# Patient Record
Sex: FEMALE | Race: WHITE | NOT HISPANIC OR LATINO | Employment: UNEMPLOYED | URBAN - METROPOLITAN AREA
[De-identification: names, ages, dates, MRNs, and addresses within clinical notes are randomized per-mention and may not be internally consistent; named-entity substitution may affect disease eponyms.]

---

## 2017-08-30 ENCOUNTER — APPOINTMENT (OUTPATIENT)
Dept: GENERAL RADIOLOGY | Facility: CLINIC | Age: 42
End: 2017-08-30
Attending: EMERGENCY MEDICINE
Payer: COMMERCIAL

## 2017-08-30 ENCOUNTER — HOSPITAL ENCOUNTER (EMERGENCY)
Facility: CLINIC | Age: 42
Discharge: HOME | End: 2017-08-30
Attending: EMERGENCY MEDICINE | Admitting: EMERGENCY MEDICINE
Payer: COMMERCIAL

## 2017-08-30 VITALS
OXYGEN SATURATION: 99 % | TEMPERATURE: 98.7 F | WEIGHT: 160 LBS | HEART RATE: 80 BPM | DIASTOLIC BLOOD PRESSURE: 67 MMHG | SYSTOLIC BLOOD PRESSURE: 93 MMHG | RESPIRATION RATE: 16 BRPM

## 2017-08-30 DIAGNOSIS — S56.129A FLEXOR TENDON LACERATION OF FINGER WITH OPEN WOUND, INITIAL ENCOUNTER: ICD-10-CM

## 2017-08-30 DIAGNOSIS — S61.209A FLEXOR TENDON LACERATION OF FINGER WITH OPEN WOUND, INITIAL ENCOUNTER: ICD-10-CM

## 2017-08-30 PROCEDURE — 99283 EMERGENCY DEPT VISIT LOW MDM: CPT

## 2017-08-30 PROCEDURE — 12001 RPR S/N/AX/GEN/TRNK 2.5CM/<: CPT

## 2017-08-30 PROCEDURE — 73130 X-RAY EXAM OF HAND: CPT | Mod: LT

## 2017-08-30 RX ORDER — LIDOCAINE HYDROCHLORIDE 10 MG/ML
INJECTION, SOLUTION EPIDURAL; INFILTRATION; INTRACAUDAL; PERINEURAL
Status: DISCONTINUED
Start: 2017-08-30 | End: 2017-08-30 | Stop reason: HOSPADM

## 2017-08-30 ASSESSMENT — ENCOUNTER SYMPTOMS
NUMBNESS: 1
WOUND: 1
LIGHT-HEADEDNESS: 1
DIZZINESS: 1

## 2017-08-30 NOTE — ED NOTES
Pt with laceration to left palm of hand after cutting herself with steak knife on accident, happened around 1pm. Unable to bend pinky and c/o numbness. ABC's intact, alert and oriented X3.

## 2017-08-30 NOTE — ED AVS SNAPSHOT
[facility] Emergency Department    [address]    [facility] [address]    Phone:  [phone]    Fax:  [phone]                                       Valencia Cruz   MRN: [id number]    Department:  [facility] Emergency Department   Date of Visit:  8/30/2017           Patient Information     Date Of Birth          1975        Your diagnoses for this visit were:     Flexor tendon laceration of finger with open wound, initial encounter        You were seen by Kory Webb MD.      Follow-up Information     Follow up with Kang Allen MD. Schedule an appointment as soon as possible for a visit in 1 day.    Specialty:  Orthopedics    Contact information:    [facility]  [address]   [facility] [address]  [phone]          Discharge Instructions         Tendon Laceration  A tendon is a thick cord that joins muscle to bone and causes the joints to bend and straighten. One of your tendons has been cut. A tendon cut may be partial or complete. A complete cut of the tendon and a severe partial cut will need stitches (sutures) in the tendon. Smaller cuts in the tendon do not require stitches, but the cut in the skin will need to be closed with stitches or staples.  A cut tendon takes about 6 weeks to regain its full strength. Forceful use of the tendon too soon could cause the weakened tendon to tear apart.  Antibiotics may be prescribed to reduce the risk of infection in the tendon.  Some tendons are located close to the nerves, therefore, it is possible to bruise or cut a nerve when you injure a tendon. This may cause numbness or weakness of the hand or foot. Because of local pain and swelling at the time of injury it can be difficult to fully assess nerve function. If you notice numbness or weakness that persists, notify your doctor. A nerve repair can be done 5-10 days after injury.  Home care    Keep the injured part elevated during  the first 48 hours to reduce swelling and pain.    If a splint was applied, leave it in place until your next exam (unless told otherwise). Keep the part dry when bathing by covering it in a plastic bag sealed with a rubber band at the top end.    If no splint was applied, change the bandage after 24 hours and begin cleaning the wound once a day with soap and water.    After removing the bandage, wash the area with soap and water. Use a wet cotton swab (Q tip) to loosen and remove any blood or crust that forms. After cleaning, apply a thin layer of over-the-counter antibiotic ointment. This will keep the wound clean and make it easier to remove the stitches or staples. Reapply a fresh bandage.    Remove the bandage to shower as usual after the first 24 hours, but do not soak the area in water (no swimming) until the stitches or staples are removed.    If antibiotics were prescribed, take them as directed until they are gone or you are told to stop.    Take medicines for pain as directed by the healthcare provider.    If you are not current on your vaccination and the object that caused the cut may lead to tetanus, you may be given a tetanus shot.  Follow-up care  Stitches placed in the tendon will not need to be removed. Stitches or staples placed in the skin will be removed in 7-10 days. Be sure to keep your appointment for removal. At your follow up visit, talk to your doctor about when to begin exercising the tendon in order to prevent stiffness.    When to seek medical advice  Call your healthcare provider right away if any of these occur:    Wound bleeding not controlled by direct pressure    Signs of infection, including increasing pain in the wound, increasing wound redness or swelling, or pus or bad odor coming from the wound    Fever of 100.4 F (38 C) or higher or as directed by your healthcare provider    Stitches or staples come apart or fall out    Wound edges re-open    Wound changes colors    Numbness  around the wound     Decreased movement around the injured area    Persistent numbness or weakness in the injured extremity  Date Last Reviewed: 6/14/2015 2000-2017 [employer]. [address] [place] [address], [place] [address]. All rights reserved. This information is not intended as a substitute for professional medical care. Always follow your healthcare professional's instructions.          24 Hour Appointment Hotline       To make an appointment at any [facility], call [phone] ([phone]). If you don't have a family doctor or clinic, we will help you find one. [facility] are conveniently located to serve the needs of you and your family.             Review of your medicines      Our records show that you are taking the medicines listed below. If these are incorrect, please call your family doctor or clinic.        Dose / Directions Last dose taken    PHENTERMINE HCL PO        Refills:  0                Procedures and tests performed during your visit     Hand XR, G/E 3 views, left      Orders Needing Specimen Collection     None      Pending Results     No orders found from 8/28/2017 to 8/31/2017.            Pending Culture Results     No orders found from 8/28/2017 to 8/31/2017.            Pending Results Instructions     If you had any lab results that were not finalized at the time of your Discharge, you can call the ED Lab Result RN at [phone]. You will be contacted by this team for any positive Lab results or changes in treatment. The nurses are available 7 days a week from 10A to 6:30P.  You can leave a message 24 hours per day and they will return your call.        Test Results From Your Hospital Stay        8/30/2017  5:07 PM      Narrative     LEFT HAND THREE OR MORE VIEWS  8/30/2017 4:53 PM     HISTORY: Laceration.    COMPARISON: None.        Impression     IMPRESSION: Bones are normally aligned. No acute fracture. Rounded  radiopaque densities overlie the distal  fourth phalanx, thought to be  external surface of the nailbed. No radiopaque foreign body. No acute  fracture. Lucency noted in the third middle phalanx, likely small bone  cyst. No aggressive features identified.    GEOVANNI WILBURN MD                Clinical Quality Measure: Blood Pressure Screening     Your blood pressure was checked while you were in the emergency department today. The last reading we obtained was  BP: 93/67 . Please read the guidelines below about what these numbers mean and what you should do about them.  If your systolic blood pressure (the top number) is less than 120 and your diastolic blood pressure (the bottom number) is less than 80, then your blood pressure is normal. There is nothing more that you need to do about it.  If your systolic blood pressure (the top number) is 120-139 or your diastolic blood pressure (the bottom number) is 80-89, your blood pressure may be higher than it should be. You should have your blood pressure rechecked within a year by a primary care provider.  If your systolic blood pressure (the top number) is 140 or greater or your diastolic blood pressure (the bottom number) is 90 or greater, you may have high blood pressure. High blood pressure is treatable, but if left untreated over time it can put you at risk for heart attack, stroke, or kidney failure. You should have your blood pressure rechecked by a primary care provider within the next 4 weeks.  If your provider in the emergency department today gave you specific instructions to follow-up with your doctor or provider even sooner than that, you should follow that instruction and not wait for up to 4 weeks for your follow-up visit.        Thank you for choosing [facility]       Thank you for choosing [facility] for your care. Our goal is always to provide you with excellent care. Hearing back from our patients is one way we can continue to improve our services. Please take a few minutes to complete the written  "survey that you may receive in the mail after you visit with us. Thank you!        [email or web address] Information     [employer] lets you send messages to your doctor, view your test results, renew your prescriptions, schedule appointments and more. To sign up, go to [email or web address] . Click on \"Log in\" on the left side of the screen, which will take you to the Welcome page. Then click on \"Sign up Now\" on the right side of the page.     You will be asked to enter the access code listed below, as well as some personal information. Please follow the directions to create your username and password.     Your access code is: [email or web address]  Expires: 2017  6:21 PM     Your access code will  in 90 days. If you need help or a new code, please call your [facility] clinic or [phone].        Care EveryWhere ID     This is your Care EveryWhere ID. This could be used by other organizations to access your [facility] medical records  [id number]        Equal Access to Services     ELÍAS LR : Hadii seema pango S[place]lie, waaxda luqadaha, qaybta kaalmaleanne wallace, makenna fiore . [facility] [phone].    ATENCIÓN: Si habla español, tiene a peace disposición servicios gratuitos de asistencia lingüística. Llame al [phone].    We comply with applicable federal civil rights laws and [place] laws. We do not discriminate on the basis of race, color, national origin, age, disability sex, sexual orientation or gender identity.            After Visit Summary       This is your record. Keep this with you and show to your community pharmacist(s) and doctor(s) at your next visit.                  "

## 2017-08-30 NOTE — ED AVS SNAPSHOT
[facility] Emergency Department    [address]    [facility] [address]    Phone:  [phone]    Fax:  [phone]                                       Valencia Cruz   MRN: [id number]    Department:  [facility] Emergency Department   Date of Visit:  8/30/2017           After Visit Summary Signature Page     I have received my discharge instructions, and my questions have been answered. I have discussed any challenges I see with this plan with the nurse or doctor.    ..........................................................................................................................................  Patient/Patient Representative Signature      ..........................................................................................................................................  Patient Representative Print Name and Relationship to Patient    ..................................................               ................................................  Date                                            Time    ..........................................................................................................................................  Reviewed by Signature/Title    ...................................................              ..............................................  Date                                                            Time

## 2017-08-30 NOTE — ED PROVIDER NOTES
History     Chief Complaint:  Laceration    HPI   Valencia Cruz is a 42 year old female who presents to the emergency department today for evaluation of a left hand laceration. The laceration is to her left palm after cutting herself with a steak knife on accident around 1 pm this afternoon. The patient got hot, dizzy and lightheaded directly after the cut. She is unable to bend her pinky and has numbness here in the emergency department.    Allergies:  Vicodin [Hydrocodone-Acetaminophen]    Medications:    Phentermine    Past Medical History:    Medical history reviewed. No pertinent medical history.    Past Surgical History:    Surgical history reviewed. No pertinent surgical history.    Family History:    Family history reviewed. No pertinent family history.     Social History:  Smoking Status: Never Smoker  Smokeless Tobacco: Never Used  Alcohol Use: Positive    Review of Systems   Skin: Positive for wound.   Neurological: Positive for dizziness, light-headedness and numbness.   All other systems reviewed and are negative.      Physical Exam   Vitals:  Patient Vitals for the past 24 hrs:   BP Temp Temp src Pulse Heart Rate Resp SpO2 Weight   08/30/17 1745 - - - - - - 99 % -   08/30/17 1730 - - - - - - 99 % -   08/30/17 1700 - - - - - - 96 % -   08/30/17 1652 93/67 - - - - - - -   08/30/17 1604 (!) 181/142 98.7  F (37.1  C) Oral 80 80 16 97 % 72.6 kg (160 lb)     Physical Exam  Constitutional: Patient is well appearing. No distress.  Head: Atraumatic.  Mouth/Throat: Oropharynx is clear and moist. No oropharyngeal exudate.  Eyes: Conjunctivae and EOM are normal. No scleral icterus.  Neck: Normal range of motion. Neck supple.   Cardiovascular: Normal rate, regular rhythm, normal heart sounds and intact distal pulses.   Pulmonary/Chest: Breath sounds normal. No respiratory distress.  Abdominal: Soft. Bowel sounds are normal. No distension. No tenderness. No rebound or guarding.   Musculoskeletal: 1 cm incisional  laceration on palafox surface of interweb between 4th and 5th fingers. Extension of right 5th finger intact. Flection proximal and distal absent. Paresthesia of 5th finger. Neurovascularly intact at tip. No edema or tenderness.   Neurological: Alert and orientated to person, place, and time. No observable focal neuro deficit  Skin: Warm and dry. No rash noted. Not diaphoretic.       Emergency Department Course     Imaging:  Hand XR, G/E 3 Views, Left  Bones are normally aligned. No acute fracture. Rounded radiopaque densities overlie the distal fourth phalanx, thought to be external surface of the nailbed. No radiopaque foreign body. No acute fracture. Lucency noted in the third middle phalanx, likely small bone cyst. No aggressive features identified.  As per radiology.    Procedure:      Laceration Repair      LACERATION:  A simple clean 1 cm laceration.    LOCATION:  Palafox side of left hand.    FUNCTION:  Distally sensation and circulation are intact.    ANESTHESIA:  Local using lidocaine total of 3 mLs     PREPARATION:  Irrigation and Scrubbing with Normal Saline and Shur Clens.    DEBRIDEMENT:  no debridement.    CLOSURE:  Wound was closed with One Layer.  Skin closed with 1 x 3.0 Ethylon using interrupted sutures..      Emergency Department Course:  Nursing notes and vitals reviewed.  I performed an exam of the patient as documented above.     The patient was sent for a Hand Xray while in the emergency department, findings above.     17:35 I cleaned and numbed the patient's laceration.  17:50 I performed the laceration repair as documented above.  18:10 I reevaluated the patient and provided an update in regards to her ED course.    18:18 I consulted with Dr. Allen, hand surgeon, about setting up surgery for the patient.    Findings and plan explained to the Patient. Patient discharged home with instructions regarding supportive care, medications, and reasons to return. The importance of close follow-up was  reviewed.       Impression & Plan      Medical Decision Making:  Valencia Cruz is a 42 year old female palmar small laceration that likely injured flexor tendons.  Discussed with on call hand simple closure splinted and has F/u in the am.      Diagnosis:    ICD-10-CM    1. Flexor tendon laceration of finger with open wound, initial encounter S56.129A     S61.209A         Disposition:   Discharged to home    Discharge Medications:  Discharge Medication List as of 8/30/2017  6:27 PM        Scribe Disclosure:  CLARICE, Gibran Bee, am serving as a scribe on 8/30/2017 at 5:17 PM to personally document services performed by Kory Webb MD based on my observations and the provider's statements to me.     [facility] EMERGENCY DEPARTMENT       Kory Webb MD  08/30/17 5016

## 2017-08-30 NOTE — DISCHARGE INSTRUCTIONS
Tendon Laceration  A tendon is a thick cord that joins muscle to bone and causes the joints to bend and straighten. One of your tendons has been cut. A tendon cut may be partial or complete. A complete cut of the tendon and a severe partial cut will need stitches (sutures) in the tendon. Smaller cuts in the tendon do not require stitches, but the cut in the skin will need to be closed with stitches or staples.  A cut tendon takes about 6 weeks to regain its full strength. Forceful use of the tendon too soon could cause the weakened tendon to tear apart.  Antibiotics may be prescribed to reduce the risk of infection in the tendon.  Some tendons are located close to the nerves, therefore, it is possible to bruise or cut a nerve when you injure a tendon. This may cause numbness or weakness of the hand or foot. Because of local pain and swelling at the time of injury it can be difficult to fully assess nerve function. If you notice numbness or weakness that persists, notify your doctor. A nerve repair can be done 5-10 days after injury.  Home care    Keep the injured part elevated during the first 48 hours to reduce swelling and pain.    If a splint was applied, leave it in place until your next exam (unless told otherwise). Keep the part dry when bathing by covering it in a plastic bag sealed with a rubber band at the top end.    If no splint was applied, change the bandage after 24 hours and begin cleaning the wound once a day with soap and water.    After removing the bandage, wash the area with soap and water. Use a wet cotton swab (Q tip) to loosen and remove any blood or crust that forms. After cleaning, apply a thin layer of over-the-counter antibiotic ointment. This will keep the wound clean and make it easier to remove the stitches or staples. Reapply a fresh bandage.    Remove the bandage to shower as usual after the first 24 hours, but do not soak the area in water (no swimming) until the stitches or  staples are removed.    If antibiotics were prescribed, take them as directed until they are gone or you are told to stop.    Take medicines for pain as directed by the healthcare provider.    If you are not current on your vaccination and the object that caused the cut may lead to tetanus, you may be given a tetanus shot.  Follow-up care  Stitches placed in the tendon will not need to be removed. Stitches or staples placed in the skin will be removed in 7-10 days. Be sure to keep your appointment for removal. At your follow up visit, talk to your doctor about when to begin exercising the tendon in order to prevent stiffness.    When to seek medical advice  Call your healthcare provider right away if any of these occur:    Wound bleeding not controlled by direct pressure    Signs of infection, including increasing pain in the wound, increasing wound redness or swelling, or pus or bad odor coming from the wound    Fever of 100.4 F (38 C) or higher or as directed by your healthcare provider    Stitches or staples come apart or fall out    Wound edges re-open    Wound changes colors    Numbness around the wound     Decreased movement around the injured area    Persistent numbness or weakness in the injured extremity  Date Last Reviewed: 6/14/2015 2000-2017 [employer]. [address] [place] [address], [place] [address]. All rights reserved. This information is not intended as a substitute for professional medical care. Always follow your healthcare professional's instructions.

## 2020-04-15 ENCOUNTER — HOSPITAL ENCOUNTER (EMERGENCY)
Facility: CLINIC | Age: 45
Discharge: HOME | End: 2020-04-16
Attending: EMERGENCY MEDICINE | Admitting: EMERGENCY MEDICINE
Payer: MEDICAID

## 2020-04-15 DIAGNOSIS — R07.89 CHEST WALL PAIN: ICD-10-CM

## 2020-04-15 LAB
ANION GAP SERPL CALCULATED.3IONS-SCNC: 5 MMOL/L (ref 3–14)
BASOPHILS # BLD AUTO: 0.1 10E9/L (ref 0–0.2)
BASOPHILS NFR BLD AUTO: 0.8 %
BUN SERPL-MCNC: 11 MG/DL (ref 7–30)
CALCIUM SERPL-MCNC: 8.5 MG/DL (ref 8.5–10.1)
CHLORIDE SERPL-SCNC: 106 MMOL/L (ref 94–109)
CO2 SERPL-SCNC: 28 MMOL/L (ref 20–32)
CREAT SERPL-MCNC: 0.65 MG/DL (ref 0.52–1.04)
D DIMER PPP FEU-MCNC: 0.9 UG/ML FEU (ref 0–0.5)
DIFFERENTIAL METHOD BLD: NORMAL
EOSINOPHIL # BLD AUTO: 0.3 10E9/L (ref 0–0.7)
EOSINOPHIL NFR BLD AUTO: 3 %
ERYTHROCYTE [DISTWIDTH] IN BLOOD BY AUTOMATED COUNT: 12.8 % (ref 10–15)
GFR SERPL CREATININE-BSD FRML MDRD: >90 ML/MIN/{1.73_M2}
GLUCOSE SERPL-MCNC: 90 MG/DL (ref 70–99)
HCT VFR BLD AUTO: 38 % (ref 35–47)
HGB BLD-MCNC: 12 G/DL (ref 11.7–15.7)
IMM GRANULOCYTES # BLD: 0 10E9/L (ref 0–0.4)
IMM GRANULOCYTES NFR BLD: 0.3 %
LYMPHOCYTES # BLD AUTO: 2.8 10E9/L (ref 0.8–5.3)
LYMPHOCYTES NFR BLD AUTO: 32.5 %
MCH RBC QN AUTO: 28.8 PG (ref 26.5–33)
MCHC RBC AUTO-ENTMCNC: 31.6 G/DL (ref 31.5–36.5)
MCV RBC AUTO: 91 FL (ref 78–100)
MONOCYTES # BLD AUTO: 0.7 10E9/L (ref 0–1.3)
MONOCYTES NFR BLD AUTO: 8.1 %
NEUTROPHILS # BLD AUTO: 4.8 10E9/L (ref 1.6–8.3)
NEUTROPHILS NFR BLD AUTO: 55.3 %
NRBC # BLD AUTO: 0 10*3/UL
NRBC BLD AUTO-RTO: 0 /100
PLATELET # BLD AUTO: 334 10E9/L (ref 150–450)
POTASSIUM SERPL-SCNC: 3.3 MMOL/L (ref 3.4–5.3)
RBC # BLD AUTO: 4.17 10E12/L (ref 3.8–5.2)
SODIUM SERPL-SCNC: 139 MMOL/L (ref 133–144)
TROPONIN I SERPL-MCNC: <0.015 UG/L (ref 0–0.04)
WBC # BLD AUTO: 8.7 10E9/L (ref 4–11)

## 2020-04-15 PROCEDURE — 84484 ASSAY OF TROPONIN QUANT: CPT | Performed by: EMERGENCY MEDICINE

## 2020-04-15 PROCEDURE — 80048 BASIC METABOLIC PNL TOTAL CA: CPT | Performed by: EMERGENCY MEDICINE

## 2020-04-15 PROCEDURE — 85379 FIBRIN DEGRADATION QUANT: CPT | Performed by: EMERGENCY MEDICINE

## 2020-04-15 PROCEDURE — 85025 COMPLETE CBC W/AUTO DIFF WBC: CPT | Performed by: EMERGENCY MEDICINE

## 2020-04-15 PROCEDURE — 93005 ELECTROCARDIOGRAM TRACING: CPT

## 2020-04-15 PROCEDURE — 99285 EMERGENCY DEPT VISIT HI MDM: CPT | Mod: 25

## 2020-04-15 PROCEDURE — 96360 HYDRATION IV INFUSION INIT: CPT | Mod: 59

## 2020-04-15 ASSESSMENT — ENCOUNTER SYMPTOMS
DIARRHEA: 0
FEVER: 0
COUGH: 1
ABDOMINAL PAIN: 0
VOMITING: 0
NAUSEA: 0

## 2020-04-15 ASSESSMENT — MIFFLIN-ST. JEOR: SCORE: 1470.13

## 2020-04-15 NOTE — ED AVS SNAPSHOT
[facility] Emergency Department  [address]  [facility] [address]  Phone:  [phone]  Fax:  [phone]                                    Valencia Cruz   MRN: [id number]    Department:  [facility] Emergency Department   Date of Visit:  4/15/2020           After Visit Summary Signature Page    I have received my discharge instructions, and my questions have been answered. I have discussed any challenges I see with this plan with the nurse or doctor.    ..........................................................................................................................................  Patient/Patient Representative Signature      ..........................................................................................................................................  Patient Representative Print Name and Relationship to Patient    ..................................................               ................................................  Date                                   Time    ..........................................................................................................................................  Reviewed by Signature/Title    ...................................................              ..............................................  Date                                               Time          22EPIC Rev 08/18

## 2020-04-16 ENCOUNTER — APPOINTMENT (OUTPATIENT)
Dept: CT IMAGING | Facility: CLINIC | Age: 45
End: 2020-04-16
Attending: EMERGENCY MEDICINE
Payer: MEDICAID

## 2020-04-16 VITALS
RESPIRATION RATE: 15 BRPM | BODY MASS INDEX: 33.44 KG/M2 | DIASTOLIC BLOOD PRESSURE: 86 MMHG | TEMPERATURE: 98.9 F | WEIGHT: 188.71 LBS | HEART RATE: 82 BPM | OXYGEN SATURATION: 100 % | HEIGHT: 63 IN | SYSTOLIC BLOOD PRESSURE: 121 MMHG

## 2020-04-16 LAB — INTERPRETATION ECG - MUSE: NORMAL

## 2020-04-16 PROCEDURE — 25800030 ZZH RX IP 258 OP 636: Performed by: EMERGENCY MEDICINE

## 2020-04-16 PROCEDURE — 71275 CT ANGIOGRAPHY CHEST: CPT

## 2020-04-16 PROCEDURE — 25000128 H RX IP 250 OP 636: Performed by: EMERGENCY MEDICINE

## 2020-04-16 PROCEDURE — 96360 HYDRATION IV INFUSION INIT: CPT | Mod: 59

## 2020-04-16 RX ORDER — IOPAMIDOL 755 MG/ML
500 INJECTION, SOLUTION INTRAVASCULAR ONCE
Status: COMPLETED | OUTPATIENT
Start: 2020-04-16 | End: 2020-04-16

## 2020-04-16 RX ADMIN — SODIUM CHLORIDE 84 ML: 9 INJECTION, SOLUTION INTRAVENOUS at 00:16

## 2020-04-16 RX ADMIN — IOPAMIDOL 64 ML: 755 INJECTION, SOLUTION INTRAVENOUS at 00:16

## 2020-04-16 NOTE — ED TRIAGE NOTES
Pt c/o L sided CP that radiates into her L shoulder since yesterday, pain worse with movement. No SOB, fever, cough, diarrhea. ABC intact.

## 2020-04-16 NOTE — ED NOTES
I explained to pt the results of her d-dimer lab test and why she will be receiving a CT scan of her chest. Additionally, I told her about how long the exam lasts and how long results typically take. She verbalized understanding, questions she had were answered.    Telephone Encounter by Fredo Cho RN at 09/05/17 04:41 PM     Author:  Fredo Cho RN Service:  (none) Author Type:  Registered Nurse     Filed:  09/05/17 04:42 PM Encounter Date:  9/4/2017 Status:  Signed     :  Fredo Cho RN (Registered Nurse)            Below refill request requires your intervention because:[DC1.1T]   Â· No protocol for medication available[DC1.1M]           Revision History        User Key Date/Time User Provider Type Action    > DC1.1 09/05/17 04:42 PM Fredo Cho RN Registered Nurse Sign    M - Manual, T - Template

## 2020-04-16 NOTE — ED PROVIDER NOTES
History     Chief Complaint:  Chest pain      HPI   Valencia Cruz is a 45 year old female who presents with chest pain. The patient developed pain to left upper back by her left axilla last night. She states that the pain stayed constant but then gradually got worse. The pain moved to her left axilla where it stayed until this morning when the pain started migrating to her left chest. She states that her pain in her left axilla last night did not get worse with palpation, but the left sided chest pain today is worse with palpation. She also notes that it hurts to take a deep breath. In addition, she states that she has a dry cough, but denies having a fever. She denies any abdominal pain, diarrhea, nausea, or vomiting.      Allergies:  Vicodin [Hydrocodone-Acetaminophen]     Medications:    Phentermine     Past Medical History:    Migraine     Past Surgical History:    D&C  Reduction mammaplasty   Nerve and tendon reattachment     Family History:    ADHD  Anxiety   Bipolar   Depression  Migraines   Osteoporosis   Thyroid disorder     Social History:  Smoking Status: former  Smokeless Tobacco: Never Used  Alcohol Use: Yes  PCP: Herlinda Berg     Review of Systems   Constitutional: Negative for fever.   Respiratory: Positive for cough.    Cardiovascular: Positive for chest pain (left).   Gastrointestinal: Negative for abdominal pain, diarrhea, nausea and vomiting.   All other systems reviewed and are negative.      Physical Exam     Patient Vitals for the past 24 hrs:   BP Temp Temp src Heart Rate Resp SpO2 Height Weight   04/16/20 0040 121/86 -- -- 81 15 100 % -- --   04/16/20 0035 -- -- -- 85 -- 100 % -- --   04/16/20 0030 -- -- -- 77 14 100 % -- --   04/16/20 0025 133/101 -- -- 75 -- 100 % -- --   04/16/20 0000 136/88 -- -- 83 16 99 % -- --   04/15/20 2345 125/84 -- -- 69 10 100 % -- --   04/15/20 2330 140/88 -- -- 83 16 99 % -- --   04/15/20 2320 138/91 -- -- 86 14 -- -- --   04/15/20 2306 136/95 98.9  F (37.2  " C) Oral 90 16 100 % 1.6 m (5' 3\") 85.6 kg (188 lb 11.4 oz)        Physical Exam  Nursing note and vitals reviewed.  Constitutional: Cooperative.   HENT:   Mouth/Throat: Mucous membranes are normal.   Cardiovascular: Normal rate, regular rhythm and normal heart sounds.  No murmur.  Pulmonary/Chest: Effort normal and breath sounds normal. No respiratory distress. No wheezes. No rales.   Abdominal: Soft. Normal appearance and bowel sounds are normal. No distension. There is no tenderness.   Musculoskeletal: No LE edema  Neurological: Alert. Oriented x4  Skin: Skin is warm and dry. No rash noted on back or left chest. .   Psychiatric: Normal mood and affect.      Emergency Department Course   ECG:  ECG taken at 2318  Normal sinus rhythm with sinus arrhythmia   Rate 75 bpm. ID interval 130 ms. QRS duration 88 ms. QT/QTc 364/406 ms. P-R-T axes 63 77 55.     Imaging:  Radiology findings were communicated with the patient who voiced understanding of the findings.    CT Chest Pulmonary Embolism w Contrast  Final Result  IMPRESSION:  1.  No visualized pulmonary embolism or other acute findings in the chest.  2.  Cholelithiasis.  Reading per radiology     Laboratory:  Laboratory findings were communicated with the patient who voiced understanding of the findings.    CBC:  WBC 8.7, HGB 12.0, , o/w WNL  BMP: potassium 3.3 (L), o/w WNL (Creatinine: 0.65)     Troponin(2318):  <0.015    D dimer quantitative: 0.9 (H)    Emergency Department Course:  Past medical records, nursing notes, and vitals reviewed.    2309 I performed an exam of the patient as documented above.    IV was inserted and blood was drawn for laboratory testing, results above.     The patient was sent for imaging while in the emergency department, results    above.       0040 Patient rechecked and updated.       Findings and plan explained to the Patient. Patient discharged home with instructions regarding supportive care, medications, and reasons to " return. The importance of close follow-up was reviewed.        Impression & Plan     Medical Decision Making:  Valencia Cruz is a 45 year old female who presents to the emergency department today with over 24 hours of positional chest pain that started in her back armpit region and is worse with movement. No fevers though she did have a dry cough recently. She is not hypoxic. Workup here are significant for an elevated dimer which prompted CT imaging. Other than cholelithiasis, this was negative. Troponin is also negative and after 24 hours of symptoms this safely rules out other cardiac causes. No pericardial effusion or concern for tia or pericarditis. No pneumonia or pneumothorax. Her abdominal exam was completely benign and the gall stones are likely an incidental finding. I suspect chest wall inflammation given her recent illness and discussed this in detail with her with appropriate follow up instructions.      Discharge Diagnosis:    ICD-10-CM    1. Chest wall pain  R07.89        Disposition:  The patient is discharged to home.    Scribe Disclosure:  IJerry, am serving as a scribe at 11:09 PM on 4/15/2020 to document services personally performed by Randy Pitts MD based on my observations and the provider's statements to me.      4/15/2020   Randy Pitts MD Amdahl, John, MD  04/16/20 0147

## 2023-01-04 ENCOUNTER — HOSPITAL ENCOUNTER (EMERGENCY)
Facility: CLINIC | Age: 48
Discharge: HOME | End: 2023-01-05
Attending: EMERGENCY MEDICINE | Admitting: EMERGENCY MEDICINE
Payer: COMMERCIAL

## 2023-01-04 DIAGNOSIS — I99.8 ISCHEMIC FINGER: ICD-10-CM

## 2023-01-04 PROCEDURE — 99284 EMERGENCY DEPT VISIT MOD MDM: CPT | Mod: 25

## 2023-01-04 ASSESSMENT — ENCOUNTER SYMPTOMS
MYALGIAS: 1
COLOR CHANGE: 1

## 2023-01-04 ASSESSMENT — ACTIVITIES OF DAILY LIVING (ADL): ADLS_ACUITY_SCORE: 33

## 2023-01-05 ENCOUNTER — APPOINTMENT (OUTPATIENT)
Dept: ULTRASOUND IMAGING | Facility: CLINIC | Age: 48
End: 2023-01-05
Attending: EMERGENCY MEDICINE
Payer: COMMERCIAL

## 2023-01-05 VITALS
HEART RATE: 87 BPM | OXYGEN SATURATION: 99 % | RESPIRATION RATE: 16 BRPM | TEMPERATURE: 98 F | SYSTOLIC BLOOD PRESSURE: 132 MMHG | DIASTOLIC BLOOD PRESSURE: 87 MMHG

## 2023-01-05 PROCEDURE — 93931 UPPER EXTREMITY STUDY: CPT | Mod: RT

## 2023-01-05 ASSESSMENT — ACTIVITIES OF DAILY LIVING (ADL): ADLS_ACUITY_SCORE: 35

## 2023-01-05 NOTE — ED TRIAGE NOTES
Pt arrives with discoloration and pain to right pointer finger. Pt states a couple of weeks ago she was diagnosed at [facility] with blood clot in finger. Since she states the finger has been causing her pain and is hard to touch and discolored. Pt hypertensive in triage.      Triage Assessment     Row Name 01/04/23 1243       Triage Assessment (Adult)    Airway WDL WDL       Respiratory WDL    Respiratory WDL WDL       Skin Circulation/Temperature WDL    Skin Circulation/Temperature WDL all  discoloration to tip of right pointer finger.       Cardiac WDL    Cardiac WDL WDL       Peripheral/Neurovascular WDL    Peripheral Neurovascular WDL WDL       Cognitive/Neuro/Behavioral WDL    Cognitive/Neuro/Behavioral WDL WDL

## 2023-01-05 NOTE — DISCHARGE INSTRUCTIONS
Start taking a baby aspirin per day until seen in follow-up by vascular surgery.    Return to ER immediately if you develop: New pain, swelling, redness, Fever > 101, persistent nausea or vomiting OR you have any other concerns about your health.

## 2023-01-05 NOTE — ED PROVIDER NOTES
"    History     Chief Complaint:  Hand Injury       The history is provided by the patient.      Valencia Cruz is a 47 year old female who presents with right hand injury. The patient states that in the beginning of December she noticed some color changes at the tip of her right pointer finger, was then seen at [facility] for the finger and they believed it to be a blood clot. She was referred to a vascular doctor but the finger began to change, becoming very hard and \"dried out\". She denies any past blood clotting issues. She denies any trauma or extreme cold to the area.    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    ROS:  Review of Systems   Musculoskeletal: Positive for myalgias (right pointer finger).   Skin: Positive for color change (right pointer finger).   All other systems reviewed and are negative.        Allergies:  Vicodin [Hydrocodone-Acetaminophen]      Medications:    Neurontin      Past Medical History:    Cholelithiasis   Migraine     Past Surgical History:    Hand nerve and tendon reattachment   Induced    Mammoplasty reduction      Family History:    ADHD  Anxiety   Bipolar disorder  Depression   Migraines   Osteoporosis   Domestic violence     Social History:     PCP: No Ref-Primary, Physician     Physical Exam     Patient Vitals for the past 24 hrs:   BP Temp Temp src Pulse Resp SpO2   23 0155 132/87 -- -- 87 16 99 %   23 2143 -- 98  F (36.7  C) Oral 95 16 97 %        Physical Exam    Gen: Resting comfortably   Eyes: Normal conjunctiva, No discharge    CV: ppi, regular, no murmur  Extremity: Right upper extremity easily palpable radial pulse.  Hand is warm throat.  There is an area of reddish discoloration at the volar surface tip of the index finger.  No significant soft tissue swelling no bleeding or oozing of serous fluid.  No malodorous or purulent drainage.  " FDS FDP extensor tendon intact.  Distal sensation intact.  Finger is warm, cap refill similar to the other digits.  There is some vertical streaking of reddish discoloration on the nail.  Resp: speaking in full sentences without any resp distress  Skin: warm dry well perfused  Neuro: Alert, no gross motor or sensory deficits,  gait stable                                                            Emergency Department Course     Imaging:  US Upper Extremity Arterial Duplex Right   Final Result   IMPRESSION:    1.  No sonographically visualized stenosis.         Report per radiology    Emergency Department Course & Assessments:         Consultations/Discussion of Management or Tests:   I consulted with the patient and obtained history.   003 I consulted with Ultrasound about the patient and conducting an exam.  015 I rechecked the patient and discussed discharge.       Disposition:  The patient was discharged to home.     Impression & Plan      Medical Decision Makin-year-old female here with right index finger pain numbness and tingling.  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  Her hand is well-perfused on examination here and it appears that this area of tissue ischemia is going through its evolution and healing process.  There is no evidence of decreased perfusion at this time.  There is no evidence of superimposed infection.  She has no real vascular risk factors or history of thrombotic disease.  She has follow-up with vascular surgery already in the planning phase.  Ultrasound of her right upper extremity does not show any anatomic areas of stenosis dissection or other abnormalities.  Safe for discharge home at this point and continue that follow-up with vascular surgery to discuss other diagnostics such as an echocardiogram thromboembolic laboratory evaluation etc.  Did ask her to take a simple aspirin per day until  seen by vascular surgery.      Diagnosis:    ICD-10-CM    1. Ischemic finger  I99.8            Discharge Medications:  Discharge Medication List as of 1/5/2023  1:52 AM           Scribe Disclosure:  I, Paul Grant, am serving as a scribe at 11:11 PM on 1/4/2023 to document services personally performed by Rudy Tim MD based on my observations and the provider's statements to me.     1/4/2023   Rudy Tim MD Walker, Jerome Richard, MD  01/05/23 0853

## 2024-08-29 ENCOUNTER — APPOINTMENT (OUTPATIENT)
Dept: CT IMAGING | Facility: CLINIC | Age: 49
End: 2024-08-29
Attending: EMERGENCY MEDICINE
Payer: COMMERCIAL

## 2024-08-29 ENCOUNTER — HOSPITAL ENCOUNTER (EMERGENCY)
Facility: CLINIC | Age: 49
Discharge: HOME | End: 2024-08-29
Attending: EMERGENCY MEDICINE | Admitting: EMERGENCY MEDICINE
Payer: COMMERCIAL

## 2024-08-29 VITALS
TEMPERATURE: 98.7 F | RESPIRATION RATE: 16 BRPM | SYSTOLIC BLOOD PRESSURE: 151 MMHG | DIASTOLIC BLOOD PRESSURE: 99 MMHG | OXYGEN SATURATION: 100 % | HEART RATE: 72 BPM

## 2024-08-29 DIAGNOSIS — L03.211 FACIAL CELLULITIS: ICD-10-CM

## 2024-08-29 LAB
ANION GAP SERPL CALCULATED.3IONS-SCNC: 9 MMOL/L (ref 7–15)
BASOPHILS # BLD AUTO: 0.1 10E3/UL (ref 0–0.2)
BASOPHILS NFR BLD AUTO: 1 %
BUN SERPL-MCNC: 15.6 MG/DL (ref 6–20)
CALCIUM SERPL-MCNC: 8.7 MG/DL (ref 8.8–10.4)
CHLORIDE SERPL-SCNC: 108 MMOL/L (ref 98–107)
CREAT SERPL-MCNC: 0.82 MG/DL (ref 0.51–0.95)
EGFRCR SERPLBLD CKD-EPI 2021: 87 ML/MIN/1.73M2
EOSINOPHIL # BLD AUTO: 0.3 10E3/UL (ref 0–0.7)
EOSINOPHIL NFR BLD AUTO: 4 %
ERYTHROCYTE [DISTWIDTH] IN BLOOD BY AUTOMATED COUNT: 14.2 % (ref 10–15)
GLUCOSE SERPL-MCNC: 93 MG/DL (ref 70–99)
HCO3 SERPL-SCNC: 25 MMOL/L (ref 22–29)
HCT VFR BLD AUTO: 36.7 % (ref 35–47)
HGB BLD-MCNC: 11.6 G/DL (ref 11.7–15.7)
IMM GRANULOCYTES # BLD: 0.1 10E3/UL
IMM GRANULOCYTES NFR BLD: 1 %
LYMPHOCYTES # BLD AUTO: 2.3 10E3/UL (ref 0.8–5.3)
LYMPHOCYTES NFR BLD AUTO: 24 %
MCH RBC QN AUTO: 28.1 PG (ref 26.5–33)
MCHC RBC AUTO-ENTMCNC: 31.6 G/DL (ref 31.5–36.5)
MCV RBC AUTO: 89 FL (ref 78–100)
MONOCYTES # BLD AUTO: 1.1 10E3/UL (ref 0–1.3)
MONOCYTES NFR BLD AUTO: 12 %
NEUTROPHILS # BLD AUTO: 5.6 10E3/UL (ref 1.6–8.3)
NEUTROPHILS NFR BLD AUTO: 59 %
NRBC # BLD AUTO: 0 10E3/UL
NRBC BLD AUTO-RTO: 0 /100
PLATELET # BLD AUTO: 365 10E3/UL (ref 150–450)
POTASSIUM SERPL-SCNC: 4.1 MMOL/L (ref 3.4–5.3)
RBC # BLD AUTO: 4.13 10E6/UL (ref 3.8–5.2)
SODIUM SERPL-SCNC: 142 MMOL/L (ref 135–145)
WBC # BLD AUTO: 9.5 10E3/UL (ref 4–11)

## 2024-08-29 PROCEDURE — 36415 COLL VENOUS BLD VENIPUNCTURE: CPT | Performed by: EMERGENCY MEDICINE

## 2024-08-29 PROCEDURE — 99285 EMERGENCY DEPT VISIT HI MDM: CPT | Mod: 25

## 2024-08-29 PROCEDURE — 85049 AUTOMATED PLATELET COUNT: CPT | Performed by: EMERGENCY MEDICINE

## 2024-08-29 PROCEDURE — 70487 CT MAXILLOFACIAL W/DYE: CPT

## 2024-08-29 PROCEDURE — 250N000011 HC RX IP 250 OP 636: Performed by: EMERGENCY MEDICINE

## 2024-08-29 PROCEDURE — 250N000009 HC RX 250: Performed by: EMERGENCY MEDICINE

## 2024-08-29 PROCEDURE — 80048 BASIC METABOLIC PNL TOTAL CA: CPT | Performed by: EMERGENCY MEDICINE

## 2024-08-29 PROCEDURE — 87040 BLOOD CULTURE FOR BACTERIA: CPT | Performed by: EMERGENCY MEDICINE

## 2024-08-29 RX ORDER — CEPHALEXIN 500 MG/1
500 CAPSULE ORAL 4 TIMES DAILY
Qty: 40 CAPSULE | Refills: 0 | Status: SHIPPED | OUTPATIENT
Start: 2024-08-29 | End: 2024-09-08

## 2024-08-29 RX ORDER — IOPAMIDOL 755 MG/ML
500 INJECTION, SOLUTION INTRAVASCULAR ONCE
Status: COMPLETED | OUTPATIENT
Start: 2024-08-29 | End: 2024-08-29

## 2024-08-29 RX ORDER — SULFAMETHOXAZOLE/TRIMETHOPRIM 800-160 MG
1 TABLET ORAL 2 TIMES DAILY
Qty: 20 TABLET | Refills: 0 | Status: SHIPPED | OUTPATIENT
Start: 2024-08-29 | End: 2024-09-08

## 2024-08-29 RX ADMIN — IOPAMIDOL 67 ML: 755 INJECTION, SOLUTION INTRAVENOUS at 13:18

## 2024-08-29 RX ADMIN — SODIUM CHLORIDE 65 ML: 9 INJECTION, SOLUTION INTRAVENOUS at 13:18

## 2024-08-29 ASSESSMENT — COLUMBIA-SUICIDE SEVERITY RATING SCALE - C-SSRS
2. HAVE YOU ACTUALLY HAD ANY THOUGHTS OF KILLING YOURSELF IN THE PAST MONTH?: NO
1. IN THE PAST MONTH, HAVE YOU WISHED YOU WERE DEAD OR WISHED YOU COULD GO TO SLEEP AND NOT WAKE UP?: NO
6. HAVE YOU EVER DONE ANYTHING, STARTED TO DO ANYTHING, OR PREPARED TO DO ANYTHING TO END YOUR LIFE?: NO

## 2024-08-29 ASSESSMENT — ACTIVITIES OF DAILY LIVING (ADL)
ADLS_ACUITY_SCORE: 35
ADLS_ACUITY_SCORE: 35

## 2024-08-29 NOTE — DISCHARGE INSTRUCTIONS
Motrin and tylenol for pain  Closely monitor your symptoms  Do not take the prednisone that is prescribed  Take keflex and bactrim  Return if symptoms worsen

## 2024-08-29 NOTE — ED PROVIDER NOTES
Emergency Department Note      History of Present Illness     Chief Complaint   Facial Pain and Facial Swelling      HPI   Valencia Cruz is a 49 year old female with a history of MRSA who presents to the ED with facial pain and swelling. The patient reports she woke up three days ago with her jaw hurting on both sides and puffiness and red nose in her nose. She states she went to urgent care the next day with facial swelling and a fever and tested negative for strep, covid, and uti and prescribed her a steroid. She states her condition got worse and she went in to get an antibiotic but could not get it due to her pharmacy being closed. She states she has taken ibuprofen, but not today, and she has not been icing her face. She endorses hypertension and nasal congestion. She denies cough, ear pain, recent dental issues, recent exposure to illness, pregnancy, recent dental procedures, history of staff infection, or history of abscess/skin infection.     Independent Historian   None    Review of External Notes   I reviewed the urgent care note from 08/27- prescribed Augmentin and prednisone. Patient did not     Past Medical History     Medical History and Problem List   Cholelithiasis  Migraine without status migrainosus  MRSA    Medications   Pseudoephedrine  Augmentin  Prednisone    Surgical History   Hand nerve and tendon reattachment (L)  Dilation and curettage  Cyst aspiration  Mammoplasty reduction    Physical Exam     Patient Vitals for the past 24 hrs:   BP Temp Temp src Pulse Resp SpO2   08/29/24 1200 (!) 153/115 98.7  F (37.1  C) Oral 77 16 98 %     Physical Exam  Constitutional:       Appearance: She is well-developed.   HENT:      Right Ear: External ear normal.      Left Ear: External ear normal.      Nose:      Comments: Facial erythema on mid bilateral cheeks and over nasal tip and mid nose. TTP over this area but no crepitus.     Mouth/Throat:      Mouth: Mucous membranes are moist.      Pharynx:  Oropharynx is clear. No oropharyngeal exudate or posterior oropharyngeal erythema.   Eyes:      General: No scleral icterus.     Conjunctiva/sclera: Conjunctivae normal.      Pupils: Pupils are equal, round, and reactive to light.   Cardiovascular:      Rate and Rhythm: Normal rate and regular rhythm.      Heart sounds: Normal heart sounds. No murmur heard.     No friction rub. No gallop.   Pulmonary:      Effort: Pulmonary effort is normal. No respiratory distress.      Breath sounds: Normal breath sounds. No wheezing or rales.   Abdominal:      General: Bowel sounds are normal. There is no distension.      Palpations: Abdomen is soft. There is no mass.      Tenderness: There is no abdominal tenderness.   Musculoskeletal:      Cervical back: Normal range of motion and neck supple.   Lymphadenopathy:      Cervical: Cervical adenopathy present.   Skin:     General: Skin is warm and dry.      Capillary Refill: Capillary refill takes less than 2 seconds.      Findings: Erythema present. No rash.      Comments: See face exam   Neurological:      Mental Status: She is alert.           Diagnostics     Lab Results   Labs Ordered and Resulted from Time of ED Arrival to Time of ED Departure   BASIC METABOLIC PANEL - Abnormal       Result Value    Sodium 142      Potassium 4.1      Chloride 108 (*)     Carbon Dioxide (CO2) 25      Anion Gap 9      Urea Nitrogen 15.6      Creatinine 0.82      GFR Estimate 87      Calcium 8.7 (*)     Glucose 93     CBC WITH PLATELETS AND DIFFERENTIAL - Abnormal    WBC Count 9.5      RBC Count 4.13      Hemoglobin 11.6 (*)     Hematocrit 36.7      MCV 89      MCH 28.1      MCHC 31.6      RDW 14.2      Platelet Count 365      % Neutrophils 59      % Lymphocytes 24      % Monocytes 12      % Eosinophils 4      % Basophils 1      % Immature Granulocytes 1      NRBCs per 100 WBC 0      Absolute Neutrophils 5.6      Absolute Lymphocytes 2.3      Absolute Monocytes 1.1      Absolute Eosinophils 0.3       Absolute Basophils 0.1      Absolute Immature Granulocytes 0.1      Absolute NRBCs 0.0     BLOOD CULTURE       Imaging   CT Facial Bones with Contrast   Final Result   IMPRESSION:   1. Suggestion of mild nonspecific bilateral premaxillary soft tissue   swelling/subcutaneous fat stranding, which may represent cellulitis.   Recommend clinical correlation. No drainable fluid collection/abscess.   2. No significant periapical lucency to suggest periapical dental   abscess.      ТАТЬЯНА GRIMALDO MD            SYSTEM ID:  [id number]          Independent Interpretation   None    ED Course      Medications Administered   Medications   CT Scan Flush (65 mLs Intravenous $Given 8/29/24 1318)   iopamidol (ISOVUE-370) solution 500 mL (67 mLs Intravenous $Given 8/29/24 1318)         Discussion of Management   None    ED Course   ED Course as of 08/29/24 1414   Thu Aug 29, 2024   1217 I obtained history and examined the patient as noted above.   1414 I rechecked the patient and explained findings.       Additional Documentation  None    Medical Decision Making / Diagnosis     CMS Diagnoses: None    MIPS   None    MDM   Valencia Cruz is a 49 year old female with history of MRSA in 2012 who presents with bilateral facial redness and low-grade temperature.  I reviewed her notes from urgent care and she was prescribed Augmentin and prednisone.  She has not picked those up due to her pharmacy being closed.  She has obvious cellulitis of her face.  We did do a CT to ensure there is not deep tissue infection and no abscess.  She is not febrile here and appears well otherwise.  I discussed with her that she should not take prednisone or Augmentin for now.  Given her ongoing infection I do not think prednisone is appropriate.  She does have a history of MRSA, so I recommend her that she start with Keflex and Bactrim in case this is MRSA.  She is given return precaution advised to keep close eye on her symptoms.  If she has worsening redness  and spread and continued fever, she needs to return right away.  Patient voiced understanding.    Disposition   The patient was discharged.     Diagnosis     ICD-10-CM    1. Facial cellulitis  L03.211            Discharge Medications   Discharge Medication List as of 8/29/2024  2:25 PM        START taking these medications    Details   cephALEXin (KEFLEX) 500 MG capsule Take 1 capsule (500 mg) by mouth 4 times daily for 10 days., Disp-40 capsule, R-0, E-Prescribe      sulfamethoxazole-trimethoprim (BACTRIM DS) 800-160 MG tablet Take 1 tablet by mouth 2 times daily for 10 days., Disp-20 tablet, R-0, E-Prescribe               Scribe Disclosure:  I, Brian Rivera, am serving as a scribe at 12:26 PM on 8/29/2024 to document services personally performed by Rodriguez Dempsey MD based on my observations and the provider's statements to me.        Rodriguez Dempsey MD  08/29/24 [id number]

## 2024-09-03 LAB — BACTERIA BLD CULT: NO GROWTH
